# Patient Record
Sex: FEMALE | Race: WHITE
[De-identification: names, ages, dates, MRNs, and addresses within clinical notes are randomized per-mention and may not be internally consistent; named-entity substitution may affect disease eponyms.]

---

## 2017-06-19 ENCOUNTER — HOSPITAL ENCOUNTER (OUTPATIENT)
Dept: HOSPITAL 62 - RAD | Age: 57
End: 2017-06-19
Attending: FAMILY MEDICINE
Payer: COMMERCIAL

## 2017-06-19 DIAGNOSIS — Z72.0: Primary | ICD-10-CM

## 2017-06-19 DIAGNOSIS — R91.8: ICD-10-CM

## 2017-06-19 PROCEDURE — G0297 LDCT FOR LUNG CA SCREEN: HCPCS

## 2017-06-19 NOTE — RADIOLOGY REPORT (SQ)
EXAM DESCRIPTION:  CT LUNG CANCER SCREENING



COMPLETED DATE/TIME:  6/19/2017 8:12 am



REASON FOR STUDY:  TOBACCO USE (Z72.0) Z72.0  TOBACCO USE

Has the patient had a Chest CT scan within the past year? No.

Was the patient offered tobacco cessation counseling? No.

Was the patient engaged in shared decision making for this test? Yes.

Does the patient have signs or symptoms of Lung Cancer? No.

Is the patient a smoker? No.

How many packs per year? 1 pack per day.

How many years since quitting smoking? 2 years.

Patients age: 57.



COMPARISON:  None.



TECHNIQUE:  Low Dose CT scan performed of the chest without intravenous contrast for purposes of scre
ening for lung cancer.  Images reviewed with lung, soft tissue and bone windows.  Reconstructed coron
al and sagittal MPR images reviewed.  All images stored on PACS.

All CT scanners at this facility use dose modulation, iterative reconstruction, and/or weight based d
osing when appropriate to reduce radiation dose to as low as reasonably achievable (ALARA).

[facility]: Dose Right  [facility]: CareDose    [facility]: Dose Right    [facility]: Teradose 4D    [facility]: Smart Technologies



RADIATION DOSE:  Up-to-date CT equipment and radiation dose reduction techniques were employed. CTDIv
ol: 2.0 mGy. DLP: 76 mGy-cm.  mGy.

 .



LIMITATIONS:  No technical limitations.



FINDINGS:  LUNG NODULES:  Description:  The primary finding is a 14.4 mm solid nodule in the left low
er lobe (series 2 image 403).  There are additional smaller pulmonary nodules.  Three subpleural nodu
les in the right lower lobe measuring 4 mm (series 2, image 325 and 352).

REMAINING LUNGS AND PLEURA:    No pleural effusions or calcifications.  No pneumothorax.   No scarrin
g or interstitial changes.

HILAR AND MEDIASTINAL STRUCTURES: No identified masses. No abnormal nodes.

HEART AND VASCULAR STRUCTURES: No aortic aneurysm.  No pericardial effusion.   No cardiac devices.

CORONARY ARTERY CALCIFICATIONS: No significant calcifications.

UPPER ABDOMEN: No significant findings.

THYROID AND OTHER SOFT TISSUES: No masses.  No adenopathy.

BONES: No significant finding.

OTHER: No other significant findings.



IMPRESSION:  SUSPICIOUS LESIONS IN THE LUNGS.

NO OTHER CLINICALLY SIGNIFICANT/POTENTIALLY CLINICALLY SIGNIFICANT FINDINGS



LUNGRADS:  LUNGRADS:  4A SUSPICIOUS; FINDINGS FOR WHICH ADDITIONAL DIAGNOSTIC TESTING AND/OR TISSUE S
AMPLING IS RECOMMENDED.

MODIFIER: NONE.



RECOMMENDATION:  Follow up with LDCT in 3 months; PET/CT may be used when there is a ? 8 mm solid com
ponent.



COMMENT:  CRITERIA:

Solid nodule(s):  ? 8 mm to < 15 mm at baseline OR growing < 8 mm OR new 6 mm to < 8 mm.

Part solid nodule(s): ? 6 mm total diameter with solid component ? 6 mm to < 8 mm OR with a new or gr
owing < 4 mm solid component.

Endobronchial nodule.



TECHNICAL DOCUMENTATION:  JOB ID:  [id number]

Quality ID # 436: Final reports with documentation of one or more dose reduction techniques (e.g., Au
tomated exposure control, adjustment of the mA and/or kV according to patient size, use of iterative 
reconstruction technique)

 2011 [employer] [facility]

## 2019-08-19 ENCOUNTER — HOSPITAL ENCOUNTER (OUTPATIENT)
Dept: HOSPITAL 62 - RAD | Age: 59
End: 2019-08-19
Attending: FAMILY MEDICINE
Payer: COMMERCIAL

## 2019-08-19 DIAGNOSIS — Z12.2: Primary | ICD-10-CM

## 2019-08-19 DIAGNOSIS — R91.8: ICD-10-CM

## 2019-08-19 DIAGNOSIS — F17.211: ICD-10-CM

## 2019-08-19 PROCEDURE — G0297 LDCT FOR LUNG CA SCREEN: HCPCS

## 2019-08-19 NOTE — RADIOLOGY REPORT (SQ)
EXAM DESCRIPTION:  CT LUNG CANCER SCREENING



COMPLETED DATE/TIME:  8/19/2019 10:19 am



REASON FOR STUDY:  F17.211 NICOTINE DEPENDENCE, CIGARETTES, IN REMISSION F17.211  NICOTINE DEPENDENCE
, CIGARETTES, IN REMISSION

Has the patient had a Chest CT scan within the past year?

Was the patient offered tobacco cessation counseling?

Was the patient engaged in shared decision making for this test?

Does the patient have signs or symptoms of Lung Cancer?

Is the patient a smoker?

How many pack years?

How many years since quitting smoking?

Patients age:



COMPARISON:  6/19/2017



TECHNIQUE:  Low Dose CT scan performed of the chest without intravenous contrast for purposes of scre
ening for lung cancer.  Images reviewed with lung, soft tissue and bone windows.  Reconstructed coron
al and sagittal MPR images reviewed.  All images stored on PACS.

All CT scanners at this facility use dose modulation, iterative reconstruction, and/or weight based d
osing when appropriate to reduce radiation dose to as low as reasonably achievable (ALARA).

[facility]: Dose Right  [facility]: CareDose    [facility]: Dose Right    [facility]: Teradose 4D    [facility]: [employer]



RADIATION DOSE:  CT Rad equipment meets quality standard of care and radiation dose reduction techniq
ues were employed. CTDIvol: 2.1 mGy. DLP: 85 mGy-cm.  mGy.

 .



LIMITATIONS:  No technical limitations.



FINDINGS:  LUNG NODULES:  Description:  Left lower lobe solid nodule and several less than 6 mm nodul
es in the right lung.  Size: Largest is in the left lower lobe, 14 x 15 mm not significantly changed.


REMAINING LUNGS AND PLEURA:    No pleural effusions or calcifications.  No pneumothorax.

HILAR AND MEDIASTINAL STRUCTURES: No identified masses. No abnormal nodes.

HEART AND VASCULAR STRUCTURES: No aortic aneurysm.  No pericardial effusion.   No cardiac devices.

CORONARY ARTERY CALCIFICATIONS: Mild to moderate calcifications.

UPPER ABDOMEN: No significant findings.

THYROID AND OTHER SOFT TISSUES: No masses.  No adenopathy.

BONES: No significant finding.

OTHER: No other significant findings.



IMPRESSION:  BENIGN FINDINGS IN THE LUNGS.

OTHER FINDINGS AS ABOVE.



LUNGRADS:  LUNGRADS:  2 BENIGN APPEARANCE OR BEHAVIOR.  NODULES WITH A VERY LOW LIKELIHOOD OF BECOMIN
G A CLINICALLY ACTIVE CANCER DUE TO SIZE OR LACK OF GROWTH.

MODIFIER: NONE.



RECOMMENDATION:  Continue annual screening with LDCT in 12 months.



COMMENT:  CRITERIA:

Solid nodule(s):  < 6 mm; new < 4 mm.

Part solid nodule(s):  < 6 mm total diameter on baseline screening.

Non solid nodule(s) (GGN):  < 20 mm OR ? 20 and unchanged or slowly growing.

Category 3 or 4 modules unchanged for ? 3 months.



TECHNICAL DOCUMENTATION:  JOB ID:  [id number]

Quality ID # 436: Final reports with documentation of one or more dose reduction techniques (e.g., Au
tomated exposure control, adjustment of the mA and/or kV according to patient size, use of iterative 
reconstruction technique)

 2011 [facility]



Reading location - IP/workstation name: [facility]-

## 2019-10-31 ENCOUNTER — HOSPITAL ENCOUNTER (OUTPATIENT)
Dept: HOSPITAL 62 - OD | Age: 59
End: 2019-10-31
Attending: FAMILY MEDICINE
Payer: COMMERCIAL

## 2019-10-31 DIAGNOSIS — E78.5: Primary | ICD-10-CM

## 2019-10-31 LAB
ALBUMIN SERPL-MCNC: 4.6 G/DL (ref 3.5–5)
ALP SERPL-CCNC: 57 U/L (ref 38–126)
ANION GAP SERPL CALC-SCNC: 7 MMOL/L (ref 5–19)
AST SERPL-CCNC: 23 U/L (ref 14–36)
BILIRUB DIRECT SERPL-MCNC: 0.1 MG/DL (ref 0–0.4)
BILIRUB SERPL-MCNC: 0.4 MG/DL (ref 0.2–1.3)
BUN SERPL-MCNC: 22 MG/DL (ref 7–20)
CALCIUM: 10.5 MG/DL (ref 8.4–10.2)
CHLORIDE SERPL-SCNC: 106 MMOL/L (ref 98–107)
CO2 SERPL-SCNC: 28 MMOL/L (ref 22–30)
GLUCOSE SERPL-MCNC: 88 MG/DL (ref 75–110)
LDLC SERPL DIRECT ASSAY-MCNC: 202 MG/DL (ref ?–100)
POTASSIUM SERPL-SCNC: 4.6 MMOL/L (ref 3.6–5)
PROT SERPL-MCNC: 7.2 G/DL (ref 6.3–8.2)
TRIGL SERPL-MCNC: 92 MG/DL (ref ?–150)
VLDLC SERPL CALC-MCNC: 18 MG/DL (ref 10–31)

## 2019-10-31 PROCEDURE — 80061 LIPID PANEL: CPT

## 2019-10-31 PROCEDURE — 36415 COLL VENOUS BLD VENIPUNCTURE: CPT

## 2019-10-31 PROCEDURE — 80053 COMPREHEN METABOLIC PANEL: CPT

## 2020-08-22 ENCOUNTER — HOSPITAL ENCOUNTER (OUTPATIENT)
Dept: HOSPITAL 62 - RAD | Age: 60
End: 2020-08-22
Attending: FAMILY MEDICINE
Payer: COMMERCIAL

## 2020-08-22 DIAGNOSIS — M54.5: ICD-10-CM

## 2020-08-22 DIAGNOSIS — M54.16: Primary | ICD-10-CM

## 2020-08-22 PROCEDURE — 72148 MRI LUMBAR SPINE W/O DYE: CPT

## 2020-08-22 PROCEDURE — 72110 X-RAY EXAM L-2 SPINE 4/>VWS: CPT

## 2020-08-22 NOTE — RADIOLOGY REPORT (SQ)
EXAM DESCRIPTION:  L SPINE WHOLE



IMAGES COMPLETED DATE/TIME:  8/22/2020 9:04 am



REASON FOR STUDY:  (M54.5)RADICULOPATHY, LUMBAR REGION M54.16  RADICULOPATHY, LUMBAR REGION



COMPARISON:  None.



NUMBER OF VIEWS:  Five views including obliques.



TECHNIQUE:  AP, lateral, oblique, and sacral radiographic images acquired of the lumbar spine.



LIMITATIONS:  None.



FINDINGS:  MINERALIZATION: Osteopenia.

SEGMENTATION: Normal.  No transitional anatomy.

ALIGNMENT: Suspect grade 1 listhesis at the lumbosacral junction.

VERTEBRAE: No definite fracture.  Limiting osteopenia.

DISCS: Disc space narrowing particularly at the lumbosacral junction.

POSTERIOR ELEMENTS: Suspect at least unilateral pars defect at L5.  Facet arthropathy.

HARDWARE: None in the spine.

PARASPINAL SOFT TISSUES: Moderate stool.  Dense aortic calcification.

PELVIS: Intact as visualized. No fractures or worrisome bone lesions. SI joints intact.

OTHER: No other significant finding.



IMPRESSION:  Lumbar spondylosis and osteopenia, findings as above.



TECHNICAL DOCUMENTATION:  JOB ID:  [id number]

 2011 [employer]- All Rights Reserved



Reading location - IP/workstation name: [email or web address]

## 2020-08-23 NOTE — RADIOLOGY REPORT (SQ)
EXAM DESCRIPTION:  MRI LUMBAR SPINE WITHOUT



IMAGES COMPLETED DATE/TIME:  8/22/2020 9:49 am



REASON FOR STUDY:  (M54.16)RADICULOPATHY, LUMBAR REGION M54.16  RADICULOPATHY, LUMBAR REGION



COMPARISON:  None.



TECHNIQUE:  Sagittal and Axial imaging includes T1, T2, STIR and gradient echo sequences. Coronal T2/
HASTE imaging.



LIMITATIONS:  None.



FINDINGS:  VISUALIZED UPPER ABDOMEN:  Limited evaluation. No acute or suspicious findings suggested.

SEGMENTATION: No transitional anatomy. The lowest well-developed disc space is labeled L5-S1.

ALIGNMENT: Grade 1 listhesis at L5-S1.

VERTEBRAE: Intact.

BONE MARROW: Mild endplate edema at L5-S1.

DISC SIGNAL: Variable disc disease.  Height loss looks most pronounced at L4-5.

POSTERIOR ELEMENTS:  Bilateral L5 pars defects.  Facet arthropathy throughout without bulky overgrowt
h.

HARDWARE: None in the spine.

CORD AND CONUS: Normal in size and signal intensity. Conus at the appropriate level.

SOFT TISSUES: No aortic aneurysm seen. No bulky retroperitoneal adenopathy or mass. No paraspinal mas
s or fluid.

L1-L2: No significant spinal stenosis or exit foraminal stenosis.

L2-L3: Mild posterior ligament thickening and facet arthropathy without stenosis.

L3-L4: Mild diminished disc signal with small central annular fissure.  No significant protrusion.  M
ild facet arthropathy and posterior ligament thickening with slight left foraminal narrowing.

L4-L5: Broad disc bulge.  No impingement on the nerve roots.  Mild foraminal narrowing.

L5-S1: Malalignment as above.  No central stenosis.  Foraminal stenosis is present which looks slight
ly worse on the right, at least moderate.

LOWER THORACIC: Incompletely imaged.  No stenosis seen.

SACRUM: Visualized upper sacrum intact.

OTHER: No other significant findings.



IMPRESSION:

1. Lumbar spondylosis.

2. Bilateral pars defects at L5 with grade 1 listhesis.



TECHNICAL DOCUMENTATION:  JOB ID:  [id number]

 [employer]- All Rights Reserved



Reading location - IP/workstation name: [email or web address]